# Patient Record
Sex: FEMALE | Race: WHITE | NOT HISPANIC OR LATINO | Employment: OTHER | URBAN - METROPOLITAN AREA
[De-identification: names, ages, dates, MRNs, and addresses within clinical notes are randomized per-mention and may not be internally consistent; named-entity substitution may affect disease eponyms.]

---

## 2020-01-03 PROBLEM — M15.9 GENERALIZED OSTEOARTHRITIS OF MULTIPLE SITES: Status: ACTIVE | Noted: 2019-11-25

## 2020-07-27 PROBLEM — M19.90 ARTHRITIS: Status: ACTIVE | Noted: 2020-07-27

## 2021-04-13 PROBLEM — R73.03 PREDIABETES: Status: ACTIVE | Noted: 2021-04-13

## 2022-02-08 PROBLEM — D64.9 SYMPTOMATIC ANEMIA: Status: ACTIVE | Noted: 2022-02-08

## 2022-02-10 LAB — CRC RECOMMENDATION EXT: NORMAL

## 2022-02-11 PROBLEM — D64.9 SYMPTOMATIC ANEMIA: Status: RESOLVED | Noted: 2022-02-08 | Resolved: 2022-02-11

## 2022-03-28 PROBLEM — R06.02 SHORTNESS OF BREATH: Status: ACTIVE | Noted: 2022-03-28

## 2022-03-28 PROBLEM — E87.6 HYPOKALEMIA: Status: ACTIVE | Noted: 2022-03-28

## 2022-03-28 PROBLEM — I50.33 ACUTE ON CHRONIC DIASTOLIC CONGESTIVE HEART FAILURE: Status: ACTIVE | Noted: 2022-03-28

## 2022-04-04 PROBLEM — I50.43 ACUTE ON CHRONIC COMBINED SYSTOLIC AND DIASTOLIC CONGESTIVE HEART FAILURE: Status: ACTIVE | Noted: 2022-03-28

## 2022-12-13 ENCOUNTER — PATIENT OUTREACH (OUTPATIENT)
Dept: ADMINISTRATIVE | Facility: HOSPITAL | Age: 82
End: 2022-12-13
Payer: MEDICARE

## 2022-12-13 NOTE — PROGRESS NOTES
Population Health Outreach.Records Received, hyper-linked into chart at this time. The following record(s)  below were uploaded for Health Maintenance .    2/10/2022-colonoscopy

## 2023-08-12 PROBLEM — J96.11 CHRONIC RESPIRATORY FAILURE WITH HYPOXIA: Status: ACTIVE | Noted: 2023-08-12

## 2023-08-12 PROBLEM — J18.9 PNEUMONIA OF LEFT LOWER LOBE DUE TO INFECTIOUS ORGANISM: Status: ACTIVE | Noted: 2023-08-12

## 2023-08-12 PROBLEM — J96.10 CHRONIC RESPIRATORY FAILURE: Status: ACTIVE | Noted: 2023-08-12

## 2023-08-12 PROBLEM — K62.5 RECTAL BLEEDING: Status: ACTIVE | Noted: 2023-08-12

## 2023-08-15 PROBLEM — I48.11 LONGSTANDING PERSISTENT ATRIAL FIBRILLATION: Status: ACTIVE | Noted: 2023-08-15

## 2023-08-17 ENCOUNTER — TELEPHONE (OUTPATIENT)
Dept: ADMINISTRATIVE | Facility: CLINIC | Age: 83
End: 2023-08-17
Payer: MEDICARE

## 2023-08-17 NOTE — PROGRESS NOTES
"Phoned patient in response to reply of "2" to post-discharge texting tracker. Mailbox full and unable to leave message.  Placed follow-up call. Spoke to Ms. Haley Velazco, patient's daughter. Ms. Velazco states that [facility] is out of the Pulmicort (budesonide) nebulizer solution that was prescribed to Ms. Caraballo at discharge, and they do not know when they will get more in stock. Offered to call another pharmacy to see if they have it so that the RX may be transferred. Ms. Velazco suggested [facility] in [place]. Phoned [facility] and spoke to Carrell who stated that they do have the Pulmicort nebules in stock. Stated he would call [facility] to have prescription transferred. Phoned Ms. Velazco to let her know that [facility] does have medication and they will transfer the RX to their location. Advised that her mobile number was left with the pharmacy and she should be hearing from then when the process is complete. Ms. Velazco verbalized understanding.    "

## 2023-08-18 ENCOUNTER — PATIENT OUTREACH (OUTPATIENT)
Dept: ADMINISTRATIVE | Facility: CLINIC | Age: 83
End: 2023-08-18
Payer: MEDICARE

## 2023-08-18 NOTE — PROGRESS NOTES
C3 nurse attempted to contact Caroline Caraballo  for a TCC post hospital discharge follow up call. No answer. No voicemail available.The patient does not have a scheduled HOSFU appointment. Message sent to PCP staff for assistance with scheduling visit with patient.

## 2023-08-21 NOTE — PROGRESS NOTES
C3 nurse spoke with Caroline Caraballo  and Romelia for a TCC post hospital discharge follow up call. The patient does not have a scheduled HOSFU appointment with Estela Bobo within 5-7 days post hospital discharge date 8/15/23. C3 nurse was unable to schedule HOSFU appointment in [facility].    Message sent to PCP staff requesting they contact patient and schedule follow up appointment.

## 2023-08-28 PROBLEM — Z79.01 CHRONIC ANTICOAGULATION: Status: ACTIVE | Noted: 2023-08-28

## 2023-08-28 PROBLEM — I45.2 BIFASCICULAR BLOCK: Status: ACTIVE | Noted: 2023-08-28

## 2023-10-05 PROBLEM — R06.02 SHORTNESS OF BREATH: Status: RESOLVED | Noted: 2022-03-28 | Resolved: 2023-10-05

## 2023-10-05 PROBLEM — I48.11 LONGSTANDING PERSISTENT ATRIAL FIBRILLATION: Status: RESOLVED | Noted: 2023-08-15 | Resolved: 2023-10-05

## 2023-10-05 PROBLEM — E87.6 HYPOKALEMIA: Status: RESOLVED | Noted: 2022-03-28 | Resolved: 2023-10-05

## 2023-11-13 PROBLEM — K62.5 RECTAL BLEEDING: Status: RESOLVED | Noted: 2023-08-12 | Resolved: 2023-11-13

## 2023-11-13 PROBLEM — J96.11 CHRONIC RESPIRATORY FAILURE WITH HYPOXIA: Status: RESOLVED | Noted: 2023-08-12 | Resolved: 2023-11-13

## 2023-11-13 PROBLEM — J18.9 PNEUMONIA OF LEFT LOWER LOBE DUE TO INFECTIOUS ORGANISM: Status: RESOLVED | Noted: 2023-08-12 | Resolved: 2023-11-13

## 2024-02-13 PROBLEM — I50.42 CHRONIC COMBINED SYSTOLIC AND DIASTOLIC CONGESTIVE HEART FAILURE: Status: ACTIVE | Noted: 2022-03-28

## 2024-03-15 PROBLEM — I70.0 AORTIC ATHEROSCLEROSIS: Status: ACTIVE | Noted: 2024-03-15

## 2024-03-15 PROBLEM — I50.33 ACUTE ON CHRONIC DIASTOLIC CONGESTIVE HEART FAILURE: Status: ACTIVE | Noted: 2024-03-15

## 2024-04-01 ENCOUNTER — PATIENT OUTREACH (OUTPATIENT)
Dept: ADMINISTRATIVE | Facility: OTHER | Age: 84
End: 2024-04-01

## 2024-04-01 NOTE — PROGRESS NOTES
CHW - Outreach Attempt    Community Health Worker left a voicemail message for 2nd attempt to contact patient regarding: sdoh  Community Health Worker to attempt to contact patient on: [phone],

## 2024-04-16 NOTE — PROGRESS NOTES
This Community Health Worker (CHW) completed Social Determinant of Health (SDOH)  Questionnaire with caregiver via telephone today. Patient denied any SDOH needs at this time.

## 2024-06-17 PROBLEM — I50.33 ACUTE ON CHRONIC DIASTOLIC CONGESTIVE HEART FAILURE: Status: RESOLVED | Noted: 2024-03-15 | Resolved: 2024-06-17

## 2024-06-19 ENCOUNTER — PATIENT OUTREACH (OUTPATIENT)
Dept: ADMINISTRATIVE | Facility: OTHER | Age: 84
End: 2024-06-19

## 2025-02-13 ENCOUNTER — PATIENT OUTREACH (OUTPATIENT)
Dept: ADMINISTRATIVE | Facility: HOSPITAL | Age: 85
End: 2025-02-13

## 2025-02-13 DIAGNOSIS — R73.03 PREDIABETES: Primary | ICD-10-CM
